# Patient Record
Sex: FEMALE | Race: WHITE | NOT HISPANIC OR LATINO | URBAN - METROPOLITAN AREA
[De-identification: names, ages, dates, MRNs, and addresses within clinical notes are randomized per-mention and may not be internally consistent; named-entity substitution may affect disease eponyms.]

---

## 2023-03-16 DIAGNOSIS — M54.2 CHRONIC NECK AND BACK PAIN: Primary | ICD-10-CM

## 2023-03-16 DIAGNOSIS — G89.29 CHRONIC NECK AND BACK PAIN: Primary | ICD-10-CM

## 2023-03-16 DIAGNOSIS — M54.9 CHRONIC NECK AND BACK PAIN: Primary | ICD-10-CM

## 2023-03-20 RX ORDER — OMEPRAZOLE 40 MG/1
40 CAPSULE, DELAYED RELEASE ORAL EVERY MORNING
COMMUNITY
Start: 2023-01-18

## 2023-03-20 RX ORDER — PHENAZOPYRIDINE HYDROCHLORIDE 200 MG/1
200 TABLET, FILM COATED ORAL 3 TIMES DAILY PRN
COMMUNITY
Start: 2022-12-31 | End: 2023-03-21

## 2023-03-20 RX ORDER — MELOXICAM 15 MG/1
15 TABLET ORAL DAILY PRN
COMMUNITY
Start: 2022-12-15 | End: 2023-03-21

## 2023-03-20 RX ORDER — VALACYCLOVIR HYDROCHLORIDE 1 G/1
1000 TABLET, FILM COATED ORAL
COMMUNITY
Start: 2023-03-06

## 2023-03-20 RX ORDER — ESTRADIOL 2 MG/1
2 TABLET ORAL DAILY
COMMUNITY
Start: 2021-05-05

## 2023-03-20 RX ORDER — SUCRALFATE 1 G/1
1 TABLET ORAL 4 TIMES DAILY
COMMUNITY
Start: 2023-01-18

## 2023-03-20 RX ORDER — FAMOTIDINE 20 MG/1
20 TABLET, FILM COATED ORAL NIGHTLY
COMMUNITY
Start: 2023-01-18

## 2023-03-21 ENCOUNTER — OFFICE VISIT (OUTPATIENT)
Dept: PAIN MEDICINE | Facility: CLINIC | Age: 38
End: 2023-03-21
Payer: COMMERCIAL

## 2023-03-21 VITALS
DIASTOLIC BLOOD PRESSURE: 87 MMHG | BODY MASS INDEX: 31.22 KG/M2 | RESPIRATION RATE: 16 BRPM | WEIGHT: 206 LBS | HEIGHT: 68 IN | OXYGEN SATURATION: 98 % | HEART RATE: 77 BPM | SYSTOLIC BLOOD PRESSURE: 143 MMHG

## 2023-03-21 DIAGNOSIS — M54.2 CHRONIC NECK PAIN: ICD-10-CM

## 2023-03-21 DIAGNOSIS — G89.29 CHRONIC NECK PAIN: ICD-10-CM

## 2023-03-21 DIAGNOSIS — M54.50 CHRONIC BILATERAL LOW BACK PAIN WITHOUT SCIATICA: Primary | ICD-10-CM

## 2023-03-21 DIAGNOSIS — M53.3 SACROILIAC JOINT PAIN: ICD-10-CM

## 2023-03-21 DIAGNOSIS — G89.29 CHRONIC BILATERAL LOW BACK PAIN WITHOUT SCIATICA: Primary | ICD-10-CM

## 2023-03-21 PROBLEM — K43.2 VENTRAL INCISIONAL HERNIA: Status: ACTIVE | Noted: 2023-03-21

## 2023-03-21 PROBLEM — N20.0 KIDNEY STONE: Status: ACTIVE | Noted: 2023-03-21

## 2023-03-21 PROBLEM — M21.40 PES PLANUS: Status: ACTIVE | Noted: 2023-03-21

## 2023-03-21 PROBLEM — J45.909 ASTHMA: Status: ACTIVE | Noted: 2023-03-21

## 2023-03-21 PROBLEM — E66.9 OBESITY: Status: ACTIVE | Noted: 2023-03-21

## 2023-03-21 PROBLEM — E89.40 POSTSURGICAL MENOPAUSE: Status: ACTIVE | Noted: 2023-03-21

## 2023-03-21 PROBLEM — J30.9 ALLERGIC RHINITIS: Status: ACTIVE | Noted: 2023-03-21

## 2023-03-21 PROBLEM — K27.9 PEPTIC ULCER: Status: ACTIVE | Noted: 2023-03-21

## 2023-03-21 PROBLEM — D64.9 ANEMIA: Status: ACTIVE | Noted: 2023-03-21

## 2023-03-21 PROBLEM — R11.0 NAUSEA: Status: ACTIVE | Noted: 2023-03-21

## 2023-03-21 PROBLEM — K21.9 GASTROESOPHAGEAL REFLUX DISEASE: Status: ACTIVE | Noted: 2023-03-21

## 2023-03-21 PROCEDURE — 99204 PR OFFICE/OUTPT VISIT, NEW, LEVL IV, 45-59 MIN: ICD-10-PCS | Mod: S$GLB,,, | Performed by: PHYSICAL MEDICINE & REHABILITATION

## 2023-03-21 PROCEDURE — 1160F PR REVIEW ALL MEDS BY PRESCRIBER/CLIN PHARMACIST DOCUMENTED: ICD-10-PCS | Mod: CPTII,S$GLB,, | Performed by: PHYSICAL MEDICINE & REHABILITATION

## 2023-03-21 PROCEDURE — 1159F MED LIST DOCD IN RCRD: CPT | Mod: CPTII,S$GLB,, | Performed by: PHYSICAL MEDICINE & REHABILITATION

## 2023-03-21 PROCEDURE — 1160F RVW MEDS BY RX/DR IN RCRD: CPT | Mod: CPTII,S$GLB,, | Performed by: PHYSICAL MEDICINE & REHABILITATION

## 2023-03-21 PROCEDURE — 99204 OFFICE O/P NEW MOD 45 MIN: CPT | Mod: S$GLB,,, | Performed by: PHYSICAL MEDICINE & REHABILITATION

## 2023-03-21 PROCEDURE — 3008F BODY MASS INDEX DOCD: CPT | Mod: CPTII,S$GLB,, | Performed by: PHYSICAL MEDICINE & REHABILITATION

## 2023-03-21 PROCEDURE — 3079F DIAST BP 80-89 MM HG: CPT | Mod: CPTII,S$GLB,, | Performed by: PHYSICAL MEDICINE & REHABILITATION

## 2023-03-21 PROCEDURE — 3077F SYST BP >= 140 MM HG: CPT | Mod: CPTII,S$GLB,, | Performed by: PHYSICAL MEDICINE & REHABILITATION

## 2023-03-21 PROCEDURE — 1159F PR MEDICATION LIST DOCUMENTED IN MEDICAL RECORD: ICD-10-PCS | Mod: CPTII,S$GLB,, | Performed by: PHYSICAL MEDICINE & REHABILITATION

## 2023-03-21 PROCEDURE — 3008F PR BODY MASS INDEX (BMI) DOCUMENTED: ICD-10-PCS | Mod: CPTII,S$GLB,, | Performed by: PHYSICAL MEDICINE & REHABILITATION

## 2023-03-21 PROCEDURE — 3077F PR MOST RECENT SYSTOLIC BLOOD PRESSURE >= 140 MM HG: ICD-10-PCS | Mod: CPTII,S$GLB,, | Performed by: PHYSICAL MEDICINE & REHABILITATION

## 2023-03-21 PROCEDURE — 3079F PR MOST RECENT DIASTOLIC BLOOD PRESSURE 80-89 MM HG: ICD-10-PCS | Mod: CPTII,S$GLB,, | Performed by: PHYSICAL MEDICINE & REHABILITATION

## 2023-03-21 RX ORDER — DULOXETIN HYDROCHLORIDE 20 MG/1
CAPSULE, DELAYED RELEASE ORAL
Qty: 42 CAPSULE | Refills: 0 | Status: SHIPPED | OUTPATIENT
Start: 2023-03-21 | End: 2023-04-18

## 2023-03-21 RX ORDER — DULOXETIN HYDROCHLORIDE 60 MG/1
60 CAPSULE, DELAYED RELEASE ORAL DAILY
Qty: 30 CAPSULE | Refills: 11 | Status: SHIPPED | OUTPATIENT
Start: 2023-03-21 | End: 2024-03-20

## 2023-03-21 RX ORDER — IBUPROFEN 800 MG/1
800 TABLET ORAL 3 TIMES DAILY PRN
COMMUNITY
End: 2023-03-21

## 2023-03-21 RX ORDER — CELECOXIB 100 MG/1
100 CAPSULE ORAL 2 TIMES DAILY PRN
Qty: 60 CAPSULE | Refills: 1 | Status: SHIPPED | OUTPATIENT
Start: 2023-03-21 | End: 2023-04-20
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
## 2023-06-27 DIAGNOSIS — G89.29 CHRONIC NECK PAIN: ICD-10-CM

## 2023-06-27 DIAGNOSIS — M54.50 CHRONIC BILATERAL LOW BACK PAIN WITHOUT SCIATICA: ICD-10-CM

## 2023-06-27 DIAGNOSIS — G89.29 CHRONIC BILATERAL LOW BACK PAIN WITHOUT SCIATICA: ICD-10-CM

## 2023-06-27 DIAGNOSIS — M54.2 CHRONIC NECK PAIN: ICD-10-CM

## 2023-06-27 DIAGNOSIS — M53.3 SACROILIAC JOINT PAIN: ICD-10-CM

## 2023-06-27 RX ORDER — DULOXETIN HYDROCHLORIDE 60 MG/1
60 CAPSULE, DELAYED RELEASE ORAL DAILY
Qty: 30 CAPSULE | Refills: 11 | Status: CANCELLED | OUTPATIENT
Start: 2023-06-27 | End: 2024-06-26

## 2023-10-04 ENCOUNTER — TELEPHONE (OUTPATIENT)
Dept: UROLOGY | Facility: CLINIC | Age: 38
End: 2023-10-04
Payer: COMMERCIAL

## 2023-10-06 DIAGNOSIS — R39.11 HESITANCY: Primary | ICD-10-CM

## 2023-10-26 ENCOUNTER — OFFICE VISIT (OUTPATIENT)
Dept: UROLOGY | Facility: CLINIC | Age: 38
End: 2023-10-26
Payer: COMMERCIAL

## 2023-10-26 DIAGNOSIS — R39.15 URINARY URGENCY: Primary | ICD-10-CM

## 2023-10-26 DIAGNOSIS — R39.11 HESITANCY: ICD-10-CM

## 2023-10-26 LAB
BILIRUBIN, UA POC OHS: NEGATIVE
BLOOD, UA POC OHS: NEGATIVE
CLARITY, UA POC OHS: ABNORMAL
COLOR, UA POC OHS: YELLOW
GLUCOSE, UA POC OHS: NEGATIVE
KETONES, UA POC OHS: NEGATIVE
LEUKOCYTES, UA POC OHS: NEGATIVE
NITRITE, UA POC OHS: NEGATIVE
PH, UA POC OHS: 6.5
PROTEIN, UA POC OHS: 30
SPECIFIC GRAVITY, UA POC OHS: 1.02
UROBILINOGEN, UA POC OHS: 0.2

## 2023-10-26 PROCEDURE — 81003 POCT URINALYSIS(INSTRUMENT): ICD-10-PCS | Mod: QW,S$GLB,, | Performed by: UROLOGY

## 2023-10-26 PROCEDURE — 1160F PR REVIEW ALL MEDS BY PRESCRIBER/CLIN PHARMACIST DOCUMENTED: ICD-10-PCS | Mod: CPTII,S$GLB,, | Performed by: UROLOGY

## 2023-10-26 PROCEDURE — 81003 URINALYSIS AUTO W/O SCOPE: CPT | Mod: QW,S$GLB,, | Performed by: UROLOGY

## 2023-10-26 PROCEDURE — 99204 OFFICE O/P NEW MOD 45 MIN: CPT | Mod: S$GLB,,, | Performed by: UROLOGY

## 2023-10-26 PROCEDURE — 1159F PR MEDICATION LIST DOCUMENTED IN MEDICAL RECORD: ICD-10-PCS | Mod: CPTII,S$GLB,, | Performed by: UROLOGY

## 2023-10-26 PROCEDURE — 99204 PR OFFICE/OUTPT VISIT, NEW, LEVL IV, 45-59 MIN: ICD-10-PCS | Mod: S$GLB,,, | Performed by: UROLOGY

## 2023-10-26 PROCEDURE — 1160F RVW MEDS BY RX/DR IN RCRD: CPT | Mod: CPTII,S$GLB,, | Performed by: UROLOGY

## 2023-10-26 PROCEDURE — 1159F MED LIST DOCD IN RCRD: CPT | Mod: CPTII,S$GLB,, | Performed by: UROLOGY

## 2023-10-26 RX ORDER — ASPIRIN 325 MG
TABLET, DELAYED RELEASE (ENTERIC COATED) ORAL
COMMUNITY
Start: 2023-07-19

## 2023-10-26 RX ORDER — DEXTROAMPHETAMINE SACCHARATE, AMPHETAMINE ASPARTATE MONOHYDRATE, DEXTROAMPHETAMINE SULFATE AND AMPHETAMINE SULFATE 7.5; 7.5; 7.5; 7.5 MG/1; MG/1; MG/1; MG/1
CAPSULE, EXTENDED RELEASE ORAL
COMMUNITY
Start: 2023-10-20

## 2023-10-26 NOTE — PROGRESS NOTES
Subjective:       Patient ID: Tessie Ramirez is a 37 y.o. female.    Chief Complaint: No chief complaint on file.      HPI:  37-year-old female with multiple complaints primarily it is urinary hesitancy she states that she has this strain really hard and not much urine comes out this is been going on for several months now she is started on ADHD medicine few months before these symptoms began she works in healthcare was able to have an ultrasound of her kidneys and a CT scan of her abdomen and pelvis I do not have the report nor do I have the images but the patient reports that she had white looking kidneys which is possibly suggestive of medical renal disease however clearly I can not speak to that without all of the information.  She also reports some retroperitoneal mass.  She also reports having a very low GFR in the 30s at some point but again I do not have any lab work    Past Medical History:   Past Medical History:   Diagnosis Date    Anemia     Asthma     GERD (gastroesophageal reflux disease)     Kidney stones     Obesity, unspecified     Other seasonal allergic rhinitis     Peptic ulcer     Postsurgical menopause     Ventral incisional hernia        Past Surgical Historical:   Past Surgical History:   Procedure Laterality Date    ADENOIDECTOMY      CARPAL TUNNEL RELEASE Bilateral     CHOLECYSTECTOMY      COLONOSCOPY W/ BIOPSIES  2013    EGD, WITH CLOSED BIOPSY      HERNIA REPAIR  2018    HYSTERECTOMY      TONSILLECTOMY          Medications:   Medication List with Changes/Refills   Current Medications    CHOLECALCIFEROL, VITAMIN D3, 1,250 MCG (50,000 UNIT) CAPSULE    TAKE 1 CAPSULE BY MOUTH EVERY WEEK FOR 28 DAYS    DEXTROAMPHETAMINE-AMPHETAMINE (ADDERALL XR) 30 MG 24 HR CAPSULE    TAKE 1 CAPSULE BY MOUTH EVERY DAY FOR 30 DAYS    DULOXETINE (CYMBALTA) 60 MG CAPSULE    Take 1 capsule (60 mg total) by mouth once daily.    ESTRADIOL (ESTRACE) 2 MG TABLET    Take 2 mg by mouth once daily.    FAMOTIDINE  (PEPCID) 20 MG TABLET    Take 20 mg by mouth every evening.    OMEPRAZOLE (PRILOSEC) 40 MG CAPSULE    Take 40 mg by mouth every morning.    SUCRALFATE (CARAFATE) 1 GRAM TABLET    Take 1 g by mouth 4 (four) times daily.    VALACYCLOVIR (VALTREX) 1000 MG TABLET    Take 1,000 mg by mouth.        Past Social History:   Social History     Socioeconomic History    Marital status:    Tobacco Use    Smoking status: Former     Types: Cigarettes    Smokeless tobacco: Never   Substance and Sexual Activity    Alcohol use: Not Currently       Allergies: Review of patient's allergies indicates:  No Known Allergies     Family History:   Family History   Problem Relation Age of Onset    Cancer Mother     Cancer Father     Diabetes Father         Review of Systems:  Review of Systems   Constitutional:  Negative for activity change and appetite change.   HENT:  Negative for congestion and dental problem.    Eyes:  Negative for pain and discharge.   Respiratory:  Negative for chest tightness and shortness of breath.    Cardiovascular:  Negative for chest pain.   Gastrointestinal:  Negative for abdominal distention and abdominal pain.   Endocrine: Negative for cold intolerance, heat intolerance and polyuria.   Genitourinary:  Negative for decreased urine volume, difficulty urinating, dyspareunia, dysuria, enuresis, flank pain, frequency, genital sores, hematuria, menstrual problem, pelvic pain, urgency, vaginal bleeding, vaginal discharge and vaginal pain.   Musculoskeletal:  Negative for back pain and neck pain.   Skin:  Negative for color change and rash.   Allergic/Immunologic: Negative for immunocompromised state.   Neurological:  Negative for dizziness.   Hematological:  Negative for adenopathy.   Psychiatric/Behavioral:  Negative for agitation, behavioral problems and confusion.        Physical Exam:  Physical Exam  Constitutional:       Appearance: She is well-developed.   HENT:      Head: Normocephalic and atraumatic.       Right Ear: External ear normal.      Left Ear: External ear normal.   Eyes:      Conjunctiva/sclera: Conjunctivae normal.   Neck:      Vascular: No JVD.   Cardiovascular:      Rate and Rhythm: Normal rate and regular rhythm.   Pulmonary:      Effort: Pulmonary effort is normal. No respiratory distress.      Breath sounds: Normal breath sounds. No wheezing.   Abdominal:      General: There is no distension.      Palpations: Abdomen is soft.      Tenderness: There is no abdominal tenderness. There is no rebound.   Musculoskeletal:         General: Normal range of motion.      Cervical back: Normal range of motion.   Skin:     General: Skin is warm and dry.      Findings: No erythema or rash.   Neurological:      Mental Status: She is alert and oriented to person, place, and time.   Psychiatric:         Behavior: Behavior normal.         Assessment/Plan:       Problem List Items Addressed This Visit    None  Visit Diagnoses       Urinary urgency    -  Primary    Hesitancy                   Urinary urgency and hesitancy:   I believe this to be overactive bladder will give her samples of gem tested to see if this helps improve her urgency I instructed her to drink more liquid but do not strain to urinate just go the bathroom and empty the best she can.      Other complaints:   I do not have any imaging or lab work or anything else to be able to make any value judgments it does sound like she possibly needs to see a nephrologist she has a retroperitoneal mass point apparently she is getting referral to General surgery will have her fax over the information or bring the disc and discuss this further

## 2024-06-05 DIAGNOSIS — M54.2 CHRONIC NECK PAIN: ICD-10-CM

## 2024-06-05 DIAGNOSIS — M54.50 CHRONIC BILATERAL LOW BACK PAIN WITHOUT SCIATICA: ICD-10-CM

## 2024-06-05 DIAGNOSIS — G89.29 CHRONIC BILATERAL LOW BACK PAIN WITHOUT SCIATICA: ICD-10-CM

## 2024-06-05 DIAGNOSIS — G89.29 CHRONIC NECK PAIN: ICD-10-CM

## 2024-06-05 DIAGNOSIS — M53.3 SACROILIAC JOINT PAIN: ICD-10-CM

## 2024-06-05 RX ORDER — DULOXETIN HYDROCHLORIDE 60 MG/1
60 CAPSULE, DELAYED RELEASE ORAL
Qty: 180 CAPSULE | Refills: 0 | OUTPATIENT
Start: 2024-06-05